# Patient Record
Sex: MALE | Race: OTHER | NOT HISPANIC OR LATINO | Employment: UNEMPLOYED | URBAN - METROPOLITAN AREA
[De-identification: names, ages, dates, MRNs, and addresses within clinical notes are randomized per-mention and may not be internally consistent; named-entity substitution may affect disease eponyms.]

---

## 2022-11-25 ENCOUNTER — HOSPITAL ENCOUNTER (EMERGENCY)
Facility: HOSPITAL | Age: 57
Discharge: HOME | End: 2022-11-26
Attending: INTERNAL MEDICINE
Payer: MEDICARE

## 2022-11-25 DIAGNOSIS — F32.A DEPRESSION WITH SUICIDAL IDEATION: ICD-10-CM

## 2022-11-25 DIAGNOSIS — F10.920 ALCOHOLIC INTOXICATION WITHOUT COMPLICATION: ICD-10-CM

## 2022-11-25 DIAGNOSIS — R45.851 DEPRESSION WITH SUICIDAL IDEATION: ICD-10-CM

## 2022-11-25 DIAGNOSIS — Z00.8 MEDICAL CLEARANCE FOR PSYCHIATRIC ADMISSION: Primary | ICD-10-CM

## 2022-11-25 LAB
ALBUMIN SERPL-MCNC: 3.7 GM/DL (ref 3.5–5)
ALBUMIN/GLOB SERPL: 1.1 RATIO (ref 1.1–2)
ALP SERPL-CCNC: 125 UNIT/L (ref 40–150)
ALT SERPL-CCNC: 59 UNIT/L (ref 0–55)
AMPHET UR QL SCN: NEGATIVE
APAP SERPL-MCNC: <17.4 UG/ML (ref 17.4–30)
APPEARANCE UR: CLEAR
AST SERPL-CCNC: 99 UNIT/L (ref 5–34)
BARBITURATE SCN PRESENT UR: POSITIVE
BASOPHILS # BLD AUTO: 0.02 X10(3)/MCL (ref 0–0.2)
BASOPHILS NFR BLD AUTO: 0.3 %
BENZODIAZ UR QL SCN: NEGATIVE
BILIRUB UR QL STRIP.AUTO: NEGATIVE MG/DL
BILIRUBIN DIRECT+TOT PNL SERPL-MCNC: 0.3 MG/DL
BUN SERPL-MCNC: 5.5 MG/DL (ref 8.4–25.7)
CALCIUM SERPL-MCNC: 9 MG/DL (ref 8.4–10.2)
CANNABINOIDS UR QL SCN: NEGATIVE
CHLORIDE SERPL-SCNC: 102 MMOL/L (ref 98–107)
CO2 SERPL-SCNC: 23 MMOL/L (ref 22–29)
COCAINE UR QL SCN: NEGATIVE
COLOR UR AUTO: YELLOW
CREAT SERPL-MCNC: 0.67 MG/DL (ref 0.73–1.18)
EOSINOPHIL # BLD AUTO: 0.16 X10(3)/MCL (ref 0–0.9)
EOSINOPHIL NFR BLD AUTO: 2.7 %
ERYTHROCYTE [DISTWIDTH] IN BLOOD BY AUTOMATED COUNT: 13.4 % (ref 11.5–17)
ETHANOL SERPL-MCNC: 372 MG/DL
GFR SERPLBLD CREATININE-BSD FMLA CKD-EPI: >60 MLS/MIN/1.73/M2
GLOBULIN SER-MCNC: 3.5 GM/DL (ref 2.4–3.5)
GLUCOSE SERPL-MCNC: 94 MG/DL (ref 74–100)
GLUCOSE UR QL STRIP.AUTO: NEGATIVE MG/DL
HCT VFR BLD AUTO: 39.2 % (ref 42–52)
HGB BLD-MCNC: 13.8 GM/DL (ref 14–18)
IMM GRANULOCYTES # BLD AUTO: 0.01 X10(3)/MCL (ref 0–0.04)
IMM GRANULOCYTES NFR BLD AUTO: 0.2 %
KETONES UR QL STRIP.AUTO: NEGATIVE MG/DL
LEUKOCYTE ESTERASE UR QL STRIP.AUTO: NEGATIVE UNIT/L
LYMPHOCYTES # BLD AUTO: 2.75 X10(3)/MCL (ref 0.6–4.6)
LYMPHOCYTES NFR BLD AUTO: 46.6 %
MCH RBC QN AUTO: 33.8 PG (ref 27–31)
MCHC RBC AUTO-ENTMCNC: 35.2 MG/DL (ref 33–36)
MCV RBC AUTO: 96.1 FL (ref 80–94)
MDMA UR QL SCN: NEGATIVE
MONOCYTES # BLD AUTO: 0.3 X10(3)/MCL (ref 0.1–1.3)
MONOCYTES NFR BLD AUTO: 5.1 %
NEUTROPHILS # BLD AUTO: 2.7 X10(3)/MCL (ref 2.1–9.2)
NEUTROPHILS NFR BLD AUTO: 45.1 %
NITRITE UR QL STRIP.AUTO: NEGATIVE
NRBC BLD AUTO-RTO: 0 %
OPIATES UR QL SCN: NEGATIVE
PCP UR QL: NEGATIVE
PH UR STRIP.AUTO: 6.5 [PH]
PH UR: 6.5 [PH] (ref 3–11)
PLATELET # BLD AUTO: 141 X10(3)/MCL (ref 130–400)
PMV BLD AUTO: 9.2 FL (ref 7.4–10.4)
POTASSIUM SERPL-SCNC: 3.3 MMOL/L (ref 3.5–5.1)
PROT SERPL-MCNC: 7.2 GM/DL (ref 6.4–8.3)
PROT UR QL STRIP.AUTO: NEGATIVE MG/DL
RBC # BLD AUTO: 4.08 X10(6)/MCL (ref 4.7–6.1)
RBC UR QL AUTO: NEGATIVE UNIT/L
SALICYLATES SERPL-MCNC: <5 MG/DL
SARS-COV-2 RDRP RESP QL NAA+PROBE: NEGATIVE
SODIUM SERPL-SCNC: 140 MMOL/L (ref 136–145)
SP GR UR STRIP.AUTO: 1.01
SPECIFIC GRAVITY, URINE AUTO (.000) (OHS): 1.01 (ref 1–1.03)
UROBILINOGEN UR STRIP-ACNC: 2 MG/DL
WBC # SPEC AUTO: 5.9 X10(3)/MCL (ref 4.5–11.5)

## 2022-11-25 PROCEDURE — 85025 COMPLETE CBC W/AUTO DIFF WBC: CPT | Performed by: INTERNAL MEDICINE

## 2022-11-25 PROCEDURE — 87635 SARS-COV-2 COVID-19 AMP PRB: CPT | Performed by: INTERNAL MEDICINE

## 2022-11-25 PROCEDURE — 80307 DRUG TEST PRSMV CHEM ANLYZR: CPT | Performed by: INTERNAL MEDICINE

## 2022-11-25 PROCEDURE — 80143 DRUG ASSAY ACETAMINOPHEN: CPT | Performed by: INTERNAL MEDICINE

## 2022-11-25 PROCEDURE — 82077 ASSAY SPEC XCP UR&BREATH IA: CPT | Performed by: INTERNAL MEDICINE

## 2022-11-25 PROCEDURE — 81003 URINALYSIS AUTO W/O SCOPE: CPT | Mod: 59 | Performed by: INTERNAL MEDICINE

## 2022-11-25 PROCEDURE — 80053 COMPREHEN METABOLIC PANEL: CPT | Performed by: INTERNAL MEDICINE

## 2022-11-25 PROCEDURE — 80179 DRUG ASSAY SALICYLATE: CPT | Performed by: INTERNAL MEDICINE

## 2022-11-25 PROCEDURE — 99285 EMERGENCY DEPT VISIT HI MDM: CPT | Mod: 25

## 2022-11-26 ENCOUNTER — HOSPITAL ENCOUNTER (INPATIENT)
Facility: HOSPITAL | Age: 57
LOS: 11 days | Discharge: HOME | DRG: 885 | End: 2022-12-07
Attending: PSYCHIATRY & NEUROLOGY | Admitting: PSYCHIATRY & NEUROLOGY
Payer: MEDICARE

## 2022-11-26 VITALS
TEMPERATURE: 98 F | RESPIRATION RATE: 20 BRPM | DIASTOLIC BLOOD PRESSURE: 78 MMHG | SYSTOLIC BLOOD PRESSURE: 132 MMHG | HEIGHT: 69 IN | HEART RATE: 96 BPM | OXYGEN SATURATION: 100 % | BODY MASS INDEX: 24.44 KG/M2 | WEIGHT: 165 LBS

## 2022-11-26 DIAGNOSIS — F33.2 SEVERE RECURRENT MAJOR DEPRESSION: ICD-10-CM

## 2022-11-26 PROBLEM — F10.10 ALCOHOL ABUSE: Status: ACTIVE | Noted: 2022-11-26

## 2022-11-26 LAB
ETHANOL SERPL-MCNC: 150 MG/DL
ETHANOL SERPL-MCNC: 200 MG/DL

## 2022-11-26 PROCEDURE — 25000003 PHARM REV CODE 250: Performed by: NURSE PRACTITIONER

## 2022-11-26 PROCEDURE — 11400000 HC PSYCH PRIVATE ROOM

## 2022-11-26 PROCEDURE — 82077 ASSAY SPEC XCP UR&BREATH IA: CPT | Performed by: EMERGENCY MEDICINE

## 2022-11-26 PROCEDURE — 82077 ASSAY SPEC XCP UR&BREATH IA: CPT | Performed by: INTERNAL MEDICINE

## 2022-11-26 RX ORDER — LOPERAMIDE HYDROCHLORIDE 2 MG/1
2 CAPSULE ORAL EVERY 4 HOURS PRN
Status: DISCONTINUED | OUTPATIENT
Start: 2022-11-26 | End: 2022-12-07 | Stop reason: HOSPADM

## 2022-11-26 RX ORDER — LORAZEPAM 1 MG/1
1 TABLET ORAL 3 TIMES DAILY
Status: COMPLETED | OUTPATIENT
Start: 2022-11-26 | End: 2022-11-26

## 2022-11-26 RX ORDER — QUETIAPINE 200 MG/1
200 TABLET, FILM COATED, EXTENDED RELEASE ORAL NIGHTLY
Status: ON HOLD | COMMUNITY
End: 2022-11-26 | Stop reason: CLARIF

## 2022-11-26 RX ORDER — MUPIROCIN 20 MG/G
OINTMENT TOPICAL 2 TIMES DAILY
Status: DISPENSED | OUTPATIENT
Start: 2022-11-26 | End: 2022-12-01

## 2022-11-26 RX ORDER — ACETAMINOPHEN 325 MG/1
650 TABLET ORAL EVERY 6 HOURS PRN
Status: DISCONTINUED | OUTPATIENT
Start: 2022-11-26 | End: 2022-12-07 | Stop reason: HOSPADM

## 2022-11-26 RX ORDER — HYDROXYZINE PAMOATE 50 MG/1
50 CAPSULE ORAL 3 TIMES DAILY PRN
Status: DISCONTINUED | OUTPATIENT
Start: 2022-11-26 | End: 2022-12-07 | Stop reason: HOSPADM

## 2022-11-26 RX ORDER — CLONIDINE HYDROCHLORIDE 0.1 MG/1
0.1 TABLET ORAL EVERY 4 HOURS PRN
Status: DISCONTINUED | OUTPATIENT
Start: 2022-11-26 | End: 2022-12-07 | Stop reason: HOSPADM

## 2022-11-26 RX ORDER — ONDANSETRON 4 MG/1
4 TABLET, ORALLY DISINTEGRATING ORAL EVERY 6 HOURS PRN
Status: DISCONTINUED | OUTPATIENT
Start: 2022-11-26 | End: 2022-12-07 | Stop reason: HOSPADM

## 2022-11-26 RX ORDER — QUETIAPINE FUMARATE 200 MG/1
200 TABLET, FILM COATED ORAL NIGHTLY
Status: ON HOLD | COMMUNITY
End: 2022-12-06 | Stop reason: HOSPADM

## 2022-11-26 RX ORDER — LORAZEPAM 0.5 MG/1
0.5 TABLET ORAL EVERY 12 HOURS
Status: COMPLETED | OUTPATIENT
Start: 2022-11-30 | End: 2022-12-01

## 2022-11-26 RX ORDER — LORAZEPAM 0.5 MG/1
0.5 TABLET ORAL EVERY 6 HOURS
Status: COMPLETED | OUTPATIENT
Start: 2022-11-28 | End: 2022-11-29

## 2022-11-26 RX ORDER — LORAZEPAM 0.5 MG/1
0.5 TABLET ORAL EVERY 8 HOURS
Status: COMPLETED | OUTPATIENT
Start: 2022-11-29 | End: 2022-11-30

## 2022-11-26 RX ORDER — ADHESIVE BANDAGE
30 BANDAGE TOPICAL DAILY PRN
Status: DISCONTINUED | OUTPATIENT
Start: 2022-11-26 | End: 2022-12-07 | Stop reason: HOSPADM

## 2022-11-26 RX ORDER — SERTRALINE HYDROCHLORIDE 100 MG/1
100 TABLET, FILM COATED ORAL DAILY
Status: ON HOLD | COMMUNITY
End: 2022-12-06 | Stop reason: HOSPADM

## 2022-11-26 RX ORDER — LORAZEPAM 1 MG/1
1 TABLET ORAL EVERY 4 HOURS
Status: DISCONTINUED | OUTPATIENT
Start: 2022-11-27 | End: 2022-11-27

## 2022-11-26 RX ADMIN — LORAZEPAM 1 MG: 1 TABLET ORAL at 08:11

## 2022-11-26 RX ADMIN — LORAZEPAM 1 MG: 1 TABLET ORAL at 11:11

## 2022-11-26 RX ADMIN — MUPIROCIN: 20 OINTMENT TOPICAL at 08:11

## 2022-11-26 RX ADMIN — LORAZEPAM 1 MG: 1 TABLET ORAL at 03:11
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
## 2022-11-26 NOTE — PLAN OF CARE
Patient admitted today;  ETOH detox protocol initiated.  POC for depression, Suicidal Ideation and other problems identified and initiated.

## 2022-11-27 LAB
CHOLEST SERPL-MCNC: 156 MG/DL
CHOLEST/HDLC SERPL: 2 {RATIO} (ref 0–5)
GLUCOSE P FAST SERPL-MCNC: 92 MG/DL (ref 74–100)
HDLC SERPL-MCNC: 73 MG/DL (ref 35–60)
LDLC SERPL CALC-MCNC: 70 MG/DL (ref 50–140)
T PALLIDUM AB SER QL: NONREACTIVE
TRIGL SERPL-MCNC: 64 MG/DL (ref 34–140)
VLDLC SERPL CALC-MCNC: 13 MG/DL

## 2022-11-27 PROCEDURE — 80061 LIPID PANEL: CPT | Performed by: NURSE PRACTITIONER

## 2022-11-27 PROCEDURE — 25000003 PHARM REV CODE 250: Performed by: NURSE PRACTITIONER

## 2022-11-27 PROCEDURE — 82947 ASSAY GLUCOSE BLOOD QUANT: CPT | Performed by: NURSE PRACTITIONER

## 2022-11-27 PROCEDURE — 86780 TREPONEMA PALLIDUM: CPT | Performed by: NURSE PRACTITIONER

## 2022-11-27 PROCEDURE — 11400000 HC PSYCH PRIVATE ROOM

## 2022-11-27 PROCEDURE — 36415 COLL VENOUS BLD VENIPUNCTURE: CPT | Performed by: NURSE PRACTITIONER

## 2022-11-27 RX ORDER — DULOXETIN HYDROCHLORIDE 30 MG/1
30 CAPSULE, DELAYED RELEASE ORAL DAILY
Status: COMPLETED | OUTPATIENT
Start: 2022-11-27 | End: 2022-11-28

## 2022-11-27 RX ORDER — LORAZEPAM 1 MG/1
1 TABLET ORAL ONCE
Status: COMPLETED | OUTPATIENT
Start: 2022-11-27 | End: 2022-11-27

## 2022-11-27 RX ORDER — QUETIAPINE FUMARATE 25 MG/1
50 TABLET, FILM COATED ORAL NIGHTLY
Status: DISCONTINUED | OUTPATIENT
Start: 2022-11-27 | End: 2022-11-29

## 2022-11-27 RX ORDER — DULOXETIN HYDROCHLORIDE 60 MG/1
60 CAPSULE, DELAYED RELEASE ORAL DAILY
Status: DISCONTINUED | OUTPATIENT
Start: 2022-11-29 | End: 2022-12-07 | Stop reason: HOSPADM

## 2022-11-27 RX ADMIN — MUPIROCIN: 20 OINTMENT TOPICAL at 08:11

## 2022-11-27 RX ADMIN — DULOXETINE HYDROCHLORIDE 30 MG: 30 CAPSULE, DELAYED RELEASE ORAL at 01:11

## 2022-11-27 RX ADMIN — LORAZEPAM 1 MG: 1 TABLET ORAL at 08:11

## 2022-11-27 RX ADMIN — LORAZEPAM 1 MG: 1 TABLET ORAL at 03:11

## 2022-11-27 RX ADMIN — QUETIAPINE FUMARATE 50 MG: 25 TABLET ORAL at 08:11

## 2022-11-27 NOTE — CONSULTS
Hospital Medicine Consultation Note        Patient Name: Abhinav Tello  MRN: [id number]  Patient Class: IP- Psych   Admission Date: 11/26/2022 10:30 AM  Length of Stay: 1  Attending Physician: [unfilled]   Primary Care Provider: Primary Doctor No  Face-to-Face encounter date: 11/27/2022 g  Consulting Physician: [facility] Medicine - Stacy Chua MD  Reason for Consult: medical evaluation  Chief Complaint: No chief complaint on file.        Patient information was obtained from patient, patient's family, past medical records.    HISTORY OF PRESENT ILLNESS:   Abhinav Tello is a 57 y.o. male with hx of substance abuse, alcohol abuse, depression, admitted to [facility] dx of major depression , substance abuse and alcohol intoxication. Pt is homeless, became increasingly depressed and suicidal . He was intoxicated and found on floor of [employer] station and brought to ER at [facility]. Etoh level 372. Admits drinking 6 beers and 8-9 shots of liquor daily. Uds pos for barbiturates, not taking or prescribed anything .  Hospital Medicine team was consulted for medical management . He is a poor historian, intoxicated but arousable, dishelved . Denies any chest pain, palpitations, sob     PAST MEDICAL HISTORY:   No past medical history on file.    PAST SURGICAL HISTORY:   No past surgical history on file.    ALLERGIES:   Patient has no known allergies.    FAMILY HISTORY:   Reviewed and negative    SOCIAL HISTORY:     Social History     Tobacco Use    Smoking status: Not on file    Smokeless tobacco: Not on file   Substance Use Topics    Alcohol use: Not on file        HOME MEDICATIONS:     Prior to Admission medications    Medication Sig Start Date End Date Taking? Authorizing Provider   QUEtiapine (SEROQUEL) 200 MG Tab Take 200 mg by mouth nightly. Non compliant.    Historical Provider   sertraline (ZOLOFT) 100 MG tablet Take 100 mg by mouth once daily. Non compliant.    Historical Provider       REVIEW OF SYSTEMS:   Except as  documented, all other systems reviewed and negative     PHYSICAL EXAM:     VITAL SIGNS: 24 HRS MIN & MAX LAST   Temp  Min: 98.8 °F (37.1 °C)  Max: 99.1 °F (37.3 °C) 99.1 °F (37.3 °C)   BP  Min: 134/67  Max: 141/88 137/89   Pulse  Min: 85  Max: 89  88   Resp  Min: 20  Max: 20 20   SpO2  Min: 96 %  Max: 97 % 97 %       Vitals Reviewed  General appearance: malnourished, dishelved   HENT: Atraumatic head. Moist mucous membranes of oral cavity.  Eyes: Normal extraocular movements.   Neck: Supple.   Lungs: Clear to auscultation bilaterally. No wheezing present.   Heart: Regular rate and rhythm. S1 and S2 present with no murmurs/gallop/rub. No pedal edema. No JVD present.   Abdomen: Soft, non-distended, non-tender. No rebound tenderness/guarding. Bowel sounds are normal.   Extremities: No cyanosis, clubbing, or edema.  Skin: No Rash.   Neuro: Mno focal neuro deficits   Psych/mental status: drowsy and intoxicated .     LABS AND IMAGING:     Recent Labs   Lab 11/25/22  2230   WBC 5.9   RBC 4.08*   HGB 13.8*   HCT 39.2*   MCV 96.1*   MCH 33.8*   MCHC 35.2   RDW 13.4      MPV 9.2       Recent Labs   Lab 11/25/22  2230      K 3.3*   CO2 23   BUN 5.5*   CREATININE 0.67*   CALCIUM 9.0   ALBUMIN 3.7   ALKPHOS 125   ALT 59*   AST 99*   BILITOT 0.3        Microbiology Results (last 7 days)       ** No results found for the last 168 hours. **               ASSESSMENT & PLAN:     Alcohol intoxication  Major depression w SI  Bipolar  Homelessness    Plan  On detox protocol per primary team  Remaining management per primary team  Medically stable  Thank you for the consult. Will be available if needed       VTE Prophylaxis: Ambulatory  __________________________________________________________________________  INPATIENT LIST OF MEDICATIONS     Current Facility-Administered Medications:     acetaminophen tablet 650 mg, 650 mg, Oral, Q6H PRN, ÓSCAR Costello    cloNIDine tablet 0.1 mg, 0.1 mg, Oral, Q4H PRN, Christos  ÓSCAR Hameed    DULoxetine DR capsule 30 mg, 30 mg, Oral, Daily, ÓSCAR Costello    [START ON 11/29/2022] DULoxetine DR capsule 60 mg, 60 mg, Oral, Daily, ÓSCAR Costello    hydrOXYzine pamoate capsule 50 mg, 50 mg, Oral, TID PRN, ÓSCAR Costello    loperamide capsule 2 mg, 2 mg, Oral, Q4H PRN, ÓSCAR Costello    [START ON 11/28/2022] LORazepam tablet 0.5 mg, 0.5 mg, Oral, Q6H, ÓSCAR Costello    [START ON 11/29/2022] LORazepam tablet 0.5 mg, 0.5 mg, Oral, Q8H, ÓSCAR Costello    [START ON 11/30/2022] LORazepam tablet 0.5 mg, 0.5 mg, Oral, Q12H, ÓSCAR Costello    LORazepam tablet 1 mg, 1 mg, Oral, Q4H, ÓSCAR Costello, 1 mg at 11/27/22 0806    magnesium hydroxide 400 mg/5 ml suspension 2,400 mg, 30 mL, Oral, Daily PRN, ÓSCAR Costello    mupirocin 2 % ointment, , Nasal, BID, ÓSCAR Costello, Given at 11/26/22 2013    ondansetron disintegrating tablet 4 mg, 4 mg, Oral, Q6H PRN, ÓSCAR Costello    QUEtiapine tablet 50 mg, 50 mg, Oral, QHS, ÓSCAR Costello      Scheduled Meds:   DULoxetine  30 mg Oral Daily    [START ON 11/29/2022] DULoxetine  60 mg Oral Daily    [START ON 11/28/2022] LORazepam  0.5 mg Oral Q6H    [START ON 11/29/2022] LORazepam  0.5 mg Oral Q8H    [START ON 11/30/2022] LORazepam  0.5 mg Oral Q12H    LORazepam  1 mg Oral Q4H    mupirocin   Nasal BID    QUEtiapine  50 mg Oral QHS     Continuous Infusions:  PRN Meds:.acetaminophen, cloNIDine, hydrOXYzine pamoate, loperamide, magnesium hydroxide 400 mg/5 ml, ondansetron    RADIOLOGY                                                                                                    No image results found.              All diagnosis and differential diagnosis have been reviewed; assessment and plan has been documented; I have personally reviewed the labs and test results that are presently available; I have reviewed the patients medication list;  I have reviewed the consulting providers response and recommendations. I have reviewed or attempted to review medical records based upon their availability.    All of the patient and family questions have been addressed and answered. Patient's is agreeable to the above stated plan. I will continue to monitor closely and make adjustments to medical management as needed.    Stacy Chua MD   [facility] Medicine Team  11/27/2022
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
## 2022-11-27 NOTE — GROUP NOTE
Education Group      Group Focus: Offered group on discharge planning.      Number of patients in attendance: 7    Group Start Time: 1500  Group End Time:  1545  Groups Date: 11/27/2022  Group Topic:  Behavioral Health  Group Department: [facility]  Group Facilitators:  Emma West LPN  _____________________________________________________________________    Patient Name: Abhinav Tello  MRN: [id number]  Patient Class: IP- Psych   Admission Date\Time: 11/26/2022 10:30 AM  Hospital Length of Stay: 1  Primary Care Provider: Primary Doctor No     Referred by: Behavioral Medicine Unit Treatment Team     Target symptoms: NA     Patient's response to treatment: did not attend     Progress toward goals: NA     Interval History: NA     Diagnosis:      Plan: Continue treatment on BMU

## 2022-11-28 PROCEDURE — 25000003 PHARM REV CODE 250: Performed by: NURSE PRACTITIONER

## 2022-11-28 PROCEDURE — 11400000 HC PSYCH PRIVATE ROOM

## 2022-11-28 RX ADMIN — ACETAMINOPHEN 650 MG: 325 TABLET, FILM COATED ORAL at 08:11

## 2022-11-28 RX ADMIN — DULOXETINE HYDROCHLORIDE 30 MG: 30 CAPSULE, DELAYED RELEASE ORAL at 08:11

## 2022-11-28 RX ADMIN — LORAZEPAM 0.5 MG: 0.5 TABLET ORAL at 11:11

## 2022-11-28 RX ADMIN — LORAZEPAM 0.5 MG: 0.5 TABLET ORAL at 05:11

## 2022-11-28 RX ADMIN — QUETIAPINE FUMARATE 50 MG: 25 TABLET ORAL at 08:11

## 2022-11-28 NOTE — NURSING
"Abhinav is withdrawn with low energy and motivation. Flat, sad affect with passive SI. Contracts to no self harm. AH hearing "negative" voices. Complained of poor sleep last night. Tolerating detox protocol, positive for body aches and tremors. Q 15 min safety checks with suicide and fall precautions. Will monitor mood and behavior and offer emotional support.  "

## 2022-11-28 NOTE — NURSING
"Lying in bed, awakens easily to voice, calm and cooperative, encouraged to get up to eat and drink, patient reports past poor appetite, tremors and shakes, presently on detox protocol,  reports +AVH "conversation in my head and can't understand, can't stop seeing these people, won't stop, shadows of people".  +SI "all the time".  Medication compliant.   Patient did get up to go get a snack in the dayroom. Will continue to monitor. Detox, fall and seizure precautions.  "
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
## 2022-11-29 PROBLEM — F10.10 ALCOHOL ABUSE: Status: RESOLVED | Noted: 2022-11-26 | Resolved: 2022-11-29

## 2022-11-29 PROBLEM — F33.2 SEVERE RECURRENT MAJOR DEPRESSION: Status: RESOLVED | Noted: 2022-11-26 | Resolved: 2022-11-29

## 2022-11-29 PROBLEM — F25.1 SCHIZOAFFECTIVE DISORDER, DEPRESSIVE TYPE: Status: ACTIVE | Noted: 2022-11-29

## 2022-11-29 PROBLEM — F10.20 ALCOHOL USE DISORDER, SEVERE, DEPENDENCE: Status: ACTIVE | Noted: 2022-11-29

## 2022-11-29 PROCEDURE — 25000003 PHARM REV CODE 250: Performed by: NURSE PRACTITIONER

## 2022-11-29 PROCEDURE — 25000003 PHARM REV CODE 250: Performed by: PSYCHIATRY & NEUROLOGY

## 2022-11-29 PROCEDURE — 11400000 HC PSYCH PRIVATE ROOM

## 2022-11-29 RX ORDER — QUETIAPINE FUMARATE 200 MG/1
200 TABLET, FILM COATED ORAL NIGHTLY
Status: DISCONTINUED | OUTPATIENT
Start: 2022-11-29 | End: 2022-11-30

## 2022-11-29 RX ADMIN — HYDROXYZINE PAMOATE 50 MG: 50 CAPSULE ORAL at 08:11

## 2022-11-29 RX ADMIN — MUPIROCIN: 20 OINTMENT TOPICAL at 08:11

## 2022-11-29 RX ADMIN — DULOXETINE HYDROCHLORIDE 60 MG: 60 CAPSULE, DELAYED RELEASE ORAL at 08:11

## 2022-11-29 RX ADMIN — LORAZEPAM 0.5 MG: 0.5 TABLET ORAL at 01:11

## 2022-11-29 RX ADMIN — LORAZEPAM 0.5 MG: 0.5 TABLET ORAL at 12:11

## 2022-11-29 RX ADMIN — LORAZEPAM 0.5 MG: 0.5 TABLET ORAL at 05:11

## 2022-11-29 RX ADMIN — LORAZEPAM 0.5 MG: 0.5 TABLET ORAL at 10:11

## 2022-11-29 RX ADMIN — QUETIAPINE FUMARATE 200 MG: 200 TABLET ORAL at 08:11

## 2022-11-29 NOTE — SUBJECTIVE & OBJECTIVE
"Seen treatment team.    Psychiatric Mental Status Exam:  General Appearance: disheveled  Arousal: alert  Behavior: cooperative  Movements and Motor Activity: tremulous  Orientation: oriented to person, place, and time  Speech: normal  Mood: Depressed  Affect: congruent  Thought Process: goal-directed  Associations: intact  Thought Content and Perceptions: he has SI, no HI, he has paranoia and auditory and visual hallucinations  Recent and Remote Memory: intact  Attention and Concentration: intact  Fund of Knowledge: aware of current events  Insight: adequate  Judgment: adequate    Family History    None       Tobacco Use    Smoking status: Not on file    Smokeless tobacco: Not on file   Substance and Sexual Activity    Alcohol use: Not on file    Drug use: Not on file    Sexual activity: Not on file     Psychotherapeutics (From admission, onward)      Start     Stop Route Frequency Ordered    11/30/22 2100  LORazepam tablet 0.5 mg         12/01 2059 Oral Every 12 hours 11/26/22 1146    11/29/22 1400  LORazepam tablet 0.5 mg         11/30 1359 Oral Every 8 hours 11/26/22 1146    11/29/22 0900  DULoxetine DR capsule 60 mg         -- Oral Daily 11/27/22 1154    11/27/22 2100  QUEtiapine tablet 50 mg         -- Oral Nightly 11/27/22 1154             Review of Systems  Objective:     Vital Signs (Most Recent):  Temp: 98.2 °F (36.8 °C) (11/29/22 0600)  Pulse: 83 (11/29/22 0600)  Resp: 18 (11/29/22 0600)  BP: 111/74 (11/29/22 0600)  SpO2: 97 % (11/29/22 0600) Vital Signs (24h Range):  Temp:  [98.2 °F (36.8 °C)-98.5 °F (36.9 °C)] 98.2 °F (36.8 °C)  Pulse:  [79-83] 83  Resp:  [18] 18  SpO2:  [97 %-98 %] 97 %  BP: (108-111)/(71-74) 111/74     Height: 5' 6" (167.6 cm)  Weight: 79.2 kg (174 lb 9.7 oz)  Body mass index is 28.18 kg/m².    No intake or output data in the 24 hours ending 11/29/22 0849    Physical Exam     Significant Labs: Last 72 Hours:   Recent Lab Results         11/27/22  0505        Cholesterol 156       Gluc " Fast 92       HDL 73       LDL Cholesterol External 70.00       Syphilis Antibody Nonreactive       Total Cholesterol/HDL Ratio 2       Triglycerides 64       Very Low Density Lipoprotein 13               Significant Imaging: None

## 2022-11-29 NOTE — PSYCH
MULTIDISCILINARY TEAM      ______________________________________________________________________  Psychiatrist Signature           Print Name    Credentials    Date/Time                      _______________________________________________________________________  Registered Nurse Signature         Print Name    Credentials    Date/Time                  _______________________________________________________________________   Signature           Print Name    Credentials    Date/Time         _______________________________________________________________________  UR Coordinator Signature          Print Name    Credentials    Date/Time                  ESTIMATED LOS: __________      I have reviewed my treatment plan with staff and have signed the Patient Responsibilities form.      ______________________________________________________________________  Patient Signature   Print Name   Date/Time
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
## 2022-11-29 NOTE — GROUP NOTE
Nursing Group      Group Focus: Symptoms Management      Number of patients in attendance: 6    Group Start Time: 1300  Group End Time:  1330  Groups Date: 11/28/2022  Group Topic:  Behavioral Health  Group Department: [facility]  Group Facilitators:  Jocelin Monson RN  _____________________________________________________________________    Patient Name: Abhinav Tello  MRN: [id number]  Patient Class: IP- Psych   Admission Date\Time: 11/26/2022 10:30 AM  Hospital Length of Stay: 2  Primary Care Provider: Primary Doctor No     Referred by: Behavioral Medicine Unit Treatment Team     Target symptoms: Alcohol Abuse, Depression, and Psychosis     Patient's response to treatment: Active Listening and Self-disclosure     Progress toward goals: Progressing slowly     Interval History: Attended and participated with good response     Diagnosis: MDD     Plan: Continue treatment on BMU

## 2022-11-29 NOTE — GROUP NOTE
Group     Group Focus: Promoting Healthy Lifestyles      Number of patients in attendance: 8    Group Start Time: 1600  Group End Time:  1630  Groups Date: 11/28/2022  Group Topic:  Behavioral Health  Group Department: [facility]  Group Facilitators:  Ana Laura Bustillo LPN  _____________________________________________________________________    Patient Name: Abhinav Tello  MRN: [id number]  Patient Class: IP- Psych   Admission Date\Time: 11/26/2022 10:30 AM  Hospital Length of Stay: 3  Primary Care Provider: Primary Doctor No     Referred by: Behavioral Medicine Unit Treatment Team     Target symptoms: Alcohol Abuse, Substance Abuse, Depression, Anxiety, Mood Disorder, and Psychosis     Patient's response to treatment: Active Listening and Self-disclosure     Progress toward goals: Progressing adequately     Interval History: Participated with good interaction     Diagnosis: SAD, depressed     Plan: Continue treatment on BMU

## 2022-11-30 PROCEDURE — 25000003 PHARM REV CODE 250: Performed by: PSYCHIATRY & NEUROLOGY

## 2022-11-30 PROCEDURE — 25000003 PHARM REV CODE 250: Performed by: NURSE PRACTITIONER

## 2022-11-30 PROCEDURE — 11400000 HC PSYCH PRIVATE ROOM

## 2022-11-30 RX ORDER — QUETIAPINE FUMARATE 200 MG/1
400 TABLET, FILM COATED ORAL NIGHTLY
Status: DISCONTINUED | OUTPATIENT
Start: 2022-11-30 | End: 2022-12-01

## 2022-11-30 RX ADMIN — QUETIAPINE FUMARATE 400 MG: 200 TABLET ORAL at 08:11

## 2022-11-30 RX ADMIN — MUPIROCIN: 20 OINTMENT TOPICAL at 08:11

## 2022-11-30 RX ADMIN — ACETAMINOPHEN 650 MG: 325 TABLET, FILM COATED ORAL at 05:11

## 2022-11-30 RX ADMIN — LORAZEPAM 0.5 MG: 0.5 TABLET ORAL at 05:11

## 2022-11-30 RX ADMIN — LORAZEPAM 0.5 MG: 0.5 TABLET ORAL at 08:11

## 2022-11-30 RX ADMIN — DULOXETINE HYDROCHLORIDE 60 MG: 60 CAPSULE, DELAYED RELEASE ORAL at 08:11
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
## 2022-11-30 NOTE — GROUP NOTE
Group Psychotherapy       Group Focus: Life Skills      Number of patients in attendance: 4    Group Start Time: 0900  Group End Time:  0945  Groups Date: 11/30/2022  Group Topic:  Behavioral Health  Group Department: [facility]  Group Facilitators:  Stewart Rain LCSW  _____________________________________________________________________    Patient Name: Abhinav Tello  MRN: [id number]  Patient Class: IP- Psych   Admission Date\Time: 11/26/2022 10:30 AM  Hospital Length of Stay: 4  Primary Care Provider: Primary Doctor No     Referred by: Behavioral Medicine Unit Treatment Team     Target symptoms: Alcohol Abuse     Patient's response to treatment: na     Progress toward goals: na     Interval History: Pt did not attend group     Diagnosis:      Plan: Continue treatment on BMU

## 2022-11-30 NOTE — SUBJECTIVE & OBJECTIVE
"Patient was seen via video telemedicine and consented to the interview. The patient was located in [place], [place] and the provider was located in [place], [place].    Psychiatric Mental Status Exam:  General Appearance: normal  Arousal: alert  Behavior: cooperative  Movements and Motor Activity: normal  Orientation: oriented to person, place, and time  Speech: normal  Mood: Depressed  Affect: congruent  Thought Process: goal-directed  Associations: intact  Thought Content and Perceptions: he has SI, no HI, he has paranoia and auditory and visual hallucinations  Recent and Remote Memory: intact  Attention and Concentration: intact  Fund of Knowledge: aware of current events  Insight: adequate  Judgment: adequate    Family History    None       Tobacco Use    Smoking status: Not on file    Smokeless tobacco: Not on file   Substance and Sexual Activity    Alcohol use: Not on file    Drug use: Not on file    Sexual activity: Not on file     Psychotherapeutics (From admission, onward)      Start     Stop Route Frequency Ordered    11/30/22 2100  LORazepam tablet 0.5 mg         12/01 2059 Oral Every 12 hours 11/26/22 1146    11/29/22 2100  QUEtiapine tablet 200 mg         -- Oral Nightly 11/29/22 0857    11/29/22 0900  DULoxetine DR capsule 60 mg         -- Oral Daily 11/27/22 1154             Review of Systems  Objective:     Vital Signs (Most Recent):  Temp: 98.3 °F (36.8 °C) (11/30/22 0608)  Pulse: 75 (11/30/22 0608)  Resp: 16 (11/30/22 0608)  BP: 124/80 (11/30/22 0608)  SpO2: 97 % (11/30/22 0608) Vital Signs (24h Range):  Temp:  [98.3 °F (36.8 °C)-98.5 °F (36.9 °C)] 98.3 °F (36.8 °C)  Pulse:  [74-75] 75  Resp:  [16-18] 16  SpO2:  [97 %-98 %] 97 %  BP: (120-124)/(75-80) 124/80     Height: 5' 6" (167.6 cm)  Weight: 79.2 kg (174 lb 9.7 oz)  Body mass index is 28.18 kg/m².    No intake or output data in the 24 hours ending 11/30/22 1316    Physical Exam     Significant Labs: Last 24 Hours:   Recent Lab Results       None      "       Significant Imaging: None
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
## 2022-11-30 NOTE — GROUP NOTE
Education Group       Group Focus: Offered group to increase knowledge of coping skills.     Number of patients in attendance: 7    Group Start Time: 1000  Group End Time:  1045  Groups Date: 11/30/2022  Group Topic:  Behavioral Health  Group Department: [facility]  Group Facilitators:  Emma West LPN  _____________________________________________________________________    Patient Name: Abhinav Tello  MRN: [id number]  Patient Class: IP- Psych   Admission Date\Time: 11/26/2022 10:30 AM  Hospital Length of Stay: 4  Primary Care Provider: Primary Doctor No     Referred by: Behavioral Medicine Unit Treatment Team     Target symptoms: Depression     Patient's response to treatment: Active Listening, Self-disclosure, and Frequent Questions     Progress toward goals: Progressing adequately     Interval History: cooperative     Diagnosis: schizoaffective     Plan: Continue treatment on BMU

## 2022-12-01 PROCEDURE — 11400000 HC PSYCH PRIVATE ROOM

## 2022-12-01 PROCEDURE — 25000003 PHARM REV CODE 250: Performed by: NURSE PRACTITIONER

## 2022-12-01 RX ORDER — QUETIAPINE FUMARATE 300 MG/1
600 TABLET, FILM COATED ORAL NIGHTLY
Status: DISCONTINUED | OUTPATIENT
Start: 2022-12-01 | End: 2022-12-07 | Stop reason: HOSPADM

## 2022-12-01 RX ADMIN — QUETIAPINE FUMARATE 600 MG: 300 TABLET ORAL at 08:12

## 2022-12-01 RX ADMIN — LORAZEPAM 0.5 MG: 0.5 TABLET ORAL at 08:12

## 2022-12-01 RX ADMIN — DULOXETINE HYDROCHLORIDE 60 MG: 60 CAPSULE, DELAYED RELEASE ORAL at 08:12

## 2022-12-01 RX ADMIN — ACETAMINOPHEN 650 MG: 325 TABLET, FILM COATED ORAL at 08:12

## 2022-12-01 RX ADMIN — HYDROXYZINE PAMOATE 50 MG: 50 CAPSULE ORAL at 01:12

## 2022-12-01 NOTE — GROUP NOTE
Group Psychotherapy       Group Focus: Psychodynamic Group Psychotherapy      Number of patients in attendance: 4    Group Start Time: 0900  Group End Time:  0945  Groups Date: 12/1/2022  Group Topic:  Behavioral Health  Group Department: [facility]  Group Facilitators:  Stewart Rain LCSW  _____________________________________________________________________    Patient Name: Abhinav Tello  MRN: [id number]  Patient Class: IP- Psych   Admission Date\Time: 11/26/2022 10:30 AM  Hospital Length of Stay: 5  Primary Care Provider: Primary Doctor No     Referred by: Behavioral Medicine Unit Treatment Team     Target symptoms: Alcohol Abuse     Patient's response to treatment: na     Progress toward goals: na     Interval History: Pt did not attend group     Diagnosis:      Plan: Continue treatment on BMU

## 2022-12-01 NOTE — PROGRESS NOTES
"12/1/2022 8:44 AM   Abhinav Tello   1965   [id number]        Psychiatry Progress Note     SUBJECTIVE:   Abhinav Tello is a 57 y.o. male admitted for alcohol abuse, hallucinations, and depression. He is nearing the end of the detox protocal. He reports mild tremors only. No nausea or diarrhea. Vital signs more stable. He is continuing to have visual hallucinations. He stated that last night he kept seeing someone "poking their head out of the shelves where we keep our clothes". He also reports seeing "alot of shadow people" running around his bed and going underneath his bed. He reported that at one point during the night he really needed the restroom but was too scared to leave his bed out of fear that someone or something "would grab my ankles from underneath". He also stated that he hears womens' voices telling him to "get out of here". He spoke of shadow people handing him things and once he touches it it turns to dust and falls to the floor. He said this happened with what he thought was a glass of water and then a candy bar. He reports that this has occurred since childhood and that he also always had night terrors and sleep paralysis and describes a figure without a face hovering over him. His sleep patterns remain problematic. He did not notice a difference with the increase in Seroquel last night so due to the severity of psychosis and impaired sleep I will increase the Seroquel to 600mg and monitor. He did agree and verbalized understanding.        Current Medications:   Scheduled Meds:    DULoxetine  60 mg Oral Daily    mupirocin   Nasal BID    QUEtiapine  400 mg Oral QHS      PRN Meds: acetaminophen, cloNIDine, hydrOXYzine pamoate, loperamide, magnesium hydroxide 400 mg/5 ml, ondansetron   Psychotherapeutics (From admission, onward)      Start     Stop Route Frequency Ordered    11/30/22 2100  QUEtiapine tablet 400 mg         -- Oral Nightly 11/30/22 1325    11/29/22 0900  DULoxetine DR capsule 60 mg     "     -- Oral Daily 11/27/22 1154            Allergies:   Review of patient's allergies indicates:  No Known Allergies     OBJECTIVE:   Vitals   Vitals:    12/01/22 0616   BP: 122/81   Pulse: 86   Resp: 18   Temp: 97.7 °F (36.5 °C)        Labs/Imaging/Studies:   No results found for this or any previous visit (from the past 36 hour(s)).       Medical Review Of Systems:  A comprehensive review of systems was negative.      Psychiatric Mental Status Exam:  General Appearance: dressed in hospital garb  Arousal: alert  Behavior: cooperative  Movements and Motor Activity: no abnormal involuntary movements noted; no tics, no tremors, no akathisia, no dystonia, no evidence of tardive dyskinesia; no psychomotor agitation or retardation  Orientation: oriented to person, place, time, and situation  Speech: normal rate, rhythm, volume, tone and pitch  Mood: Anxious  Affect: mood-congruent  Thought Process: organized  Associations: no loosening of associations  Thought Content and Perceptions: + visual hallucinations, +auditory hallucinations; denies any SI/HI/death wishes  Recent and Remote Memory: intact  Attention and Concentration: intact  Fund of Knowledge: aware of current events  Insight: adequate  Judgment:  improved    ASSESSMENT/PLAN:   Diagnosis:  SCHIZOPHRENIA AND OTHER PSYCHOTIC DISORDERS; Schizoaffective Disorder  depressed type    Alcohol use disorder severe  Plan:  Increase Seroquel to 600mg every evening    Expected Disposition Plan:  He is requesting to return home to [place] where his family is. He needs money to buy a ticket to get there. He does have several checks that he cannot cash since he has no ID. He is hoping that the [employer] that was helping him could cash at least one of the checks for him so that he can buy what he needs to get home        Christos JOE PMHNP  Psychiatry  [facility]

## 2022-12-01 NOTE — NURSING
"Lying in bed, appears asleep at this time. Earlier patient complaining of "night frederick", awake sitting on the side of the bed, patient reporting having "visions of people walking in my room.  I thought people were going to kill me.  During the day as well.  Including my roommate.  I also felt drunk all over again.  I saw someone trying to get in my window."  Interacts well with peers and staff in the dayroom.   Medication compliant.    Speech is clear.  Anxious.  Will continue to monitor.    "

## 2022-12-01 NOTE — GROUP NOTE
Education Group      Group Focus: Stress Management      Number of patients in attendance: 5    Group Start Time: 0945  Group End Time:  1045  Groups Date: 12/1/2022  Group Topic:  Behavioral Health  Group Department: [facility]  Group Facilitators:  Emma West LPN  _____________________________________________________________________    Patient Name: Abhinav Tello  MRN: [id number]  Patient Class: IP- Psych   Admission Date\Time: 11/26/2022 10:30 AM  Hospital Length of Stay: 5  Primary Care Provider: Primary Doctor No     Referred by: Behavioral Medicine Unit Treatment Team     Target symptoms: na     Patient's response to treatment: did not attend     Progress toward goals: na     Interval History: na   Diagnosis:      Plan: Continue treatment on BMU

## 2022-12-02 PROCEDURE — 25000003 PHARM REV CODE 250: Performed by: NURSE PRACTITIONER

## 2022-12-02 PROCEDURE — 25000003 PHARM REV CODE 250: Performed by: PSYCHIATRY & NEUROLOGY

## 2022-12-02 PROCEDURE — 11400000 HC PSYCH PRIVATE ROOM

## 2022-12-02 RX ORDER — BUSPIRONE HYDROCHLORIDE 10 MG/1
10 TABLET ORAL 2 TIMES DAILY
Status: DISCONTINUED | OUTPATIENT
Start: 2022-12-02 | End: 2022-12-07 | Stop reason: HOSPADM

## 2022-12-02 RX ADMIN — QUETIAPINE FUMARATE 600 MG: 300 TABLET ORAL at 08:12

## 2022-12-02 RX ADMIN — BUSPIRONE HYDROCHLORIDE 10 MG: 10 TABLET ORAL at 08:12

## 2022-12-02 RX ADMIN — DULOXETINE HYDROCHLORIDE 60 MG: 60 CAPSULE, DELAYED RELEASE ORAL at 08:12

## 2022-12-02 NOTE — GROUP NOTE
Group Psychotherapy       Group Focus: Changing thinking process from being ill to being well.      Number of patients in attendance: 5    Group Start Time: 0900  Group End Time:  0945  Groups Date: 12/2/2022  Group Topic:  Behavioral Health  Group Department: [facility]  Group Facilitators:  Stewart Rain LCSW  _____________________________________________________________________    Patient Name: Abhinav Tello  MRN: [id number]  Patient Class: IP- Psych   Admission Date\Time: 11/26/2022 10:30 AM  Hospital Length of Stay: 6  Primary Care Provider: Primary Doctor No     Referred by: Behavioral Medicine Unit Treatment Team     Target symptoms: Alcohol Abuse and Mood Disorder     Patient's response to treatment: Active Listening and Self-disclosure     Progress toward goals: Progressing adequately     Interval History: Pt much improved.  Group explored changing thought processes to promote wellness     Diagnosis:      Plan: Continue treatment on BMU

## 2022-12-02 NOTE — SUBJECTIVE & OBJECTIVE
"Patient was seen via video telemedicine and consented to the interview. The patient was located in [place], [place] and the provider was located in [place], [place].    Psychiatric Mental Status Exam:  General Appearance: normal  Arousal: alert  Behavior: cooperative  Movements and Motor Activity: normal  Orientation: oriented to person, place, and time  Speech: normal  Mood: anxious and depressed  Affect: congruent  Thought Process: goal-directed  Associations: intact  Thought Content and Perceptions: no SI/HI, No paranoia and no auditory or visual hallucinations  Recent and Remote Memory: intact  Attention and Concentration: intact  Fund of Knowledge: aware of current events  Insight: adequate  Judgment: adequate    Family History    None       Tobacco Use    Smoking status: Not on file    Smokeless tobacco: Not on file   Substance and Sexual Activity    Alcohol use: Not on file    Drug use: Not on file    Sexual activity: Not on file     Psychotherapeutics (From admission, onward)      Start     Stop Route Frequency Ordered    12/01/22 2100  QUEtiapine tablet 600 mg        Question:  Is the patient competent?  Answer:  Yes    -- Oral Nightly 12/01/22 0852    11/29/22 0900  DULoxetine DR capsule 60 mg         -- Oral Daily 11/27/22 1154             Review of Systems  Objective:     Vital Signs (Most Recent):  Temp: 97.6 °F (36.4 °C) (12/02/22 0611)  Pulse: 81 (12/02/22 0611)  Resp: 18 (12/02/22 0611)  BP: 119/73 (12/02/22 0611)  SpO2: 100 % (12/02/22 0611) Vital Signs (24h Range):  Temp:  [97.6 °F (36.4 °C)-98.4 °F (36.9 °C)] 97.6 °F (36.4 °C)  Pulse:  [72-81] 81  Resp:  [16-18] 18  SpO2:  [97 %-100 %] 100 %  BP: (112-119)/(70-73) 119/73     Height: 5' 6" (167.6 cm)  Weight: 79.2 kg (174 lb 9.7 oz)  Body mass index is 28.18 kg/m².    No intake or output data in the 24 hours ending 12/02/22 1425    Physical Exam     Significant Labs: Last 72 Hours:   Recent Lab Results       None            Significant Imaging: None  "
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
## 2022-12-02 NOTE — GROUP NOTE
Nursing Group       Group Focus: Symptoms Management      Number of patients in attendance: 6    Group Start Time: 1000  Group End Time:  1030  Groups Date: 12/2/2022  Group Topic:  Behavioral Health  Group Department: [facility]  Group Facilitators:  Jocelin Monson RN  _____________________________________________________________________    Patient Name: Abhinav Tello  MRN: [id number]  Patient Class: IP- Psych   Admission Date\Time: 11/26/2022 10:30 AM  Hospital Length of Stay: 6  Primary Care Provider: Primary Doctor No     Referred by: Behavioral Medicine Unit Treatment Team     Target symptoms: Depression and Anxiety     Patient's response to treatment: Active Listening and Self-disclosure     Progress toward goals: Progressing adequately     Interval History: Attended and participated with god response     Diagnosis: SAD     Plan: Continue treatment on BMU

## 2022-12-03 PROCEDURE — 25000003 PHARM REV CODE 250: Performed by: NURSE PRACTITIONER

## 2022-12-03 PROCEDURE — 11400000 HC PSYCH PRIVATE ROOM

## 2022-12-03 PROCEDURE — 25000003 PHARM REV CODE 250: Performed by: PSYCHIATRY & NEUROLOGY

## 2022-12-03 RX ADMIN — ACETAMINOPHEN 650 MG: 325 TABLET, FILM COATED ORAL at 10:12

## 2022-12-03 RX ADMIN — DULOXETINE HYDROCHLORIDE 60 MG: 60 CAPSULE, DELAYED RELEASE ORAL at 08:12

## 2022-12-03 RX ADMIN — BUSPIRONE HYDROCHLORIDE 10 MG: 10 TABLET ORAL at 08:12

## 2022-12-03 RX ADMIN — QUETIAPINE FUMARATE 600 MG: 300 TABLET ORAL at 08:12

## 2022-12-03 NOTE — GROUP NOTE
Group     Group Focus: Stress Management      Number of patients in attendance: 2    Group Start Time: 1515  Group End Time:  1545  Groups Date: 12/2/2022  Group Topic:  Behavioral Health  Group Department: [facility]  Group Facilitators:  Ana Laura Bustillo LPN  _____________________________________________________________________    Patient Name: Abhinav Tello  MRN: [id number]  Patient Class: IP- Psych   Admission Date\Time: 11/26/2022 10:30 AM  Hospital Length of Stay: 6  Primary Care Provider: Primary Doctor No     Referred by: Behavioral Medicine Unit Treatment Team     Target symptoms: Alcohol Abuse, Substance Abuse, Mood Disorder, and Psychosis     Patient's response to treatment: Active Listening and Self-disclosure     Progress toward goals: Progressing adequately     Interval History: Participated with good interaction     Diagnosis: SAD     Plan: Continue treatment on BMU

## 2022-12-03 NOTE — NURSING
Awake, alert and oriented. Mood and affect improved. Detox protocol complete. Denies SI. AVH decreased  Complained of poor sleep last night. Met with Harley Lopez added bid for increased anxiety. Q 15 min safety checks with suicide precautions. Will monitor mood and offer emotional support.

## 2022-12-03 NOTE — GROUP NOTE
Group       Group Focus: Relationship Dynamics      Number of patients in attendance: 3    Group Start Time: 0930  Group End Time:  1000  Groups Date: 12/3/2022  Group Topic:  Behavioral Health  Group Department: [facility]  Group Facilitators:  Ana Laura Bustillo LPN  _____________________________________________________________________    Patient Name: Abhinav Tello  MRN: [id number]  Patient Class: IP- Psych   Admission Date\Time: 11/26/2022 10:30 AM  Hospital Length of Stay: 7  Primary Care Provider: Primary Doctor No     Referred by: Behavioral Medicine Unit Treatment Team     Target symptoms: Depression and Mood Disorder     Patient's response to treatment: Active Listening and Self-disclosure     Progress toward goals: Progressing well     Interval History: Participated with good interaction     Diagnosis: SAD depressed     Plan: Continue treatment on BMU

## 2022-12-04 PROCEDURE — 25000003 PHARM REV CODE 250: Performed by: NURSE PRACTITIONER

## 2022-12-04 PROCEDURE — 25000003 PHARM REV CODE 250: Performed by: PSYCHIATRY & NEUROLOGY

## 2022-12-04 PROCEDURE — 11400000 HC PSYCH PRIVATE ROOM

## 2022-12-04 RX ADMIN — BUSPIRONE HYDROCHLORIDE 10 MG: 10 TABLET ORAL at 08:12

## 2022-12-04 RX ADMIN — QUETIAPINE FUMARATE 600 MG: 300 TABLET ORAL at 08:12

## 2022-12-04 RX ADMIN — ACETAMINOPHEN 650 MG: 325 TABLET, FILM COATED ORAL at 12:12

## 2022-12-04 RX ADMIN — DULOXETINE HYDROCHLORIDE 60 MG: 60 CAPSULE, DELAYED RELEASE ORAL at 08:12

## 2022-12-04 NOTE — NURSING
"Awake alert and oriented. Mood and affect improved. Denies SI or hallucinations. "Meds are working well". Sleeping and eating well. Q 15 min safety checks with suicide and fall precautions. Will monitor mood and behavior and offer emotional support.  "

## 2022-12-04 NOTE — GROUP NOTE
Nursing Group      Group Focus: Symptoms Management      Number of patients in attendance: 7    Group Start Time: 1015  Group End Time:  1045  Groups Date: 12/3/2022  Group Topic:  Behavioral Health  Group Department: [facility]  Group Facilitators:  Jocelin Monson RN  _____________________________________________________________________    Patient Name: Abhinav Tello  MRN: [id number]  Patient Class: IP- Psych   Admission Date\Time: 11/26/2022 10:30 AM  Hospital Length of Stay: 7  Primary Care Provider: Primary Doctor No     Referred by: Behavioral Medicine Unit Treatment Team     Target symptoms: Depression and Anxiety     Patient's response to treatment: Active Listening and Self-disclosure     Progress toward goals: Progressing adequately     Interval History: Attended and participated with good response     Diagnosis: SAD     Plan: Continue treatment on BMU
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
## 2022-12-04 NOTE — NURSING
Abhinav denies SI and AVH. He contracts for safety. He reports that anxiety is decreasing. Pleasant and cooperative. Paranoia has resolved. He is more visible. Affect has improved. No S/S withdrawals observed. Med compliant and satisfied with medication regimen. Sleep has improved. He is hoping to go to [place] upon D/C. Encouragement given. (+) coping skills discussed. Education provided. He is being monitored closely. Q15 observations maintained for safety and precautions. No S/S seizure activity witnessed nor reported.

## 2022-12-05 PROCEDURE — 25000003 PHARM REV CODE 250: Performed by: PSYCHIATRY & NEUROLOGY

## 2022-12-05 PROCEDURE — 11400000 HC PSYCH PRIVATE ROOM

## 2022-12-05 PROCEDURE — 25000003 PHARM REV CODE 250: Performed by: NURSE PRACTITIONER

## 2022-12-05 RX ADMIN — QUETIAPINE FUMARATE 600 MG: 300 TABLET ORAL at 08:12

## 2022-12-05 RX ADMIN — BUSPIRONE HYDROCHLORIDE 10 MG: 10 TABLET ORAL at 08:12

## 2022-12-05 RX ADMIN — ACETAMINOPHEN 650 MG: 325 TABLET, FILM COATED ORAL at 08:12

## 2022-12-05 RX ADMIN — DULOXETINE HYDROCHLORIDE 60 MG: 60 CAPSULE, DELAYED RELEASE ORAL at 08:12

## 2022-12-05 NOTE — GROUP NOTE
Medication Education      Group Focus: Offered group to increase knowledge of meds, side effects, safety and importance of compliance.       Number of patients in attendance: 4    Group Start Time: 0900  Group End Time:  0945  Groups Date: 12/5/2022  Group Topic:  Behavioral Health  Group Department: [facility]  Group Facilitators:  Emma West LPN  _____________________________________________________________________    Patient Name: Abhinav Tello  MRN: [id number]  Patient Class: IP- Psych   Admission Date\Time: 11/26/2022 10:30 AM  Hospital Length of Stay: 9  Primary Care Provider: Primary Doctor No     Referred by: Behavioral Medicine Unit Treatment Team     Target symptoms: na     Patient's response to treatment: did not attend     Progress toward goals: na     Interval History: na     Diagnosis:      Plan: Continue treatment on BMU

## 2022-12-05 NOTE — NURSING
Abhinav denies SI and contracts for safety. Denies AVH. He is pleasant and cooperative. Anxiety has decreased. No s/s withdrawals. V/S are WDL. Somewhat guarded. Less restless. Energy is leveling. Speech is soft and quiet. No psychotic symptoms. Pt is satisfied with medications. Sleep improving. Continues to hope for D/C to [place]. Encouragement given. (+) coping skills discussed. Q15 observations maintained for safety and precautions.Abhinav is being monitored closely.

## 2022-12-05 NOTE — GROUP NOTE
Group       Group Focus: Communication Skills      Number of patients in attendance: 4    Group Start Time: 1615  Group End Time:  1645  Groups Date: 12/4/2022  Group Topic:  Behavioral Health  Group Department: [facility]  Group Facilitators:  Ana Laura Bustillo LPN  _____________________________________________________________________    Patient Name: Abhinav Tello  MRN: [id number]  Patient Class: IP- Psych   Admission Date\Time: 11/26/2022 10:30 AM  Hospital Length of Stay: 8  Primary Care Provider: Primary Doctor No     Referred by: Behavioral Medicine Unit Treatment Team     Target symptoms: Depression, Anxiety, and Mood Disorder     Patient's response to treatment: Active Listening and Self-disclosure     Progress toward goals: Progressing well     Interval History: Participated with good interaction     Diagnosis: SAD     Plan: Continue treatment on BMU

## 2022-12-06 PROCEDURE — 11400000 HC PSYCH PRIVATE ROOM

## 2022-12-06 PROCEDURE — 25000003 PHARM REV CODE 250: Performed by: NURSE PRACTITIONER

## 2022-12-06 PROCEDURE — 25000003 PHARM REV CODE 250: Performed by: PSYCHIATRY & NEUROLOGY

## 2022-12-06 RX ORDER — QUETIAPINE FUMARATE 300 MG/1
600 TABLET, FILM COATED ORAL NIGHTLY
Qty: 60 TABLET | Refills: 0 | Status: SHIPPED | OUTPATIENT
Start: 2022-12-06 | End: 2023-01-05

## 2022-12-06 RX ORDER — DULOXETIN HYDROCHLORIDE 60 MG/1
60 CAPSULE, DELAYED RELEASE ORAL DAILY
Qty: 30 CAPSULE | Refills: 0 | Status: SHIPPED | OUTPATIENT
Start: 2022-12-07 | End: 2023-01-06

## 2022-12-06 RX ORDER — BUSPIRONE HYDROCHLORIDE 10 MG/1
10 TABLET ORAL 2 TIMES DAILY
Qty: 60 TABLET | Refills: 0 | Status: SHIPPED | OUTPATIENT
Start: 2022-12-06 | End: 2023-01-05

## 2022-12-06 RX ADMIN — BUSPIRONE HYDROCHLORIDE 10 MG: 10 TABLET ORAL at 08:12

## 2022-12-06 RX ADMIN — DULOXETINE HYDROCHLORIDE 60 MG: 60 CAPSULE, DELAYED RELEASE ORAL at 08:12

## 2022-12-06 RX ADMIN — ACETAMINOPHEN 650 MG: 325 TABLET, FILM COATED ORAL at 09:12

## 2022-12-06 RX ADMIN — QUETIAPINE FUMARATE 600 MG: 300 TABLET ORAL at 08:12

## 2022-12-06 NOTE — PLAN OF CARE
Patient attended tx team today.  He presented as calm,  in a good mood,  verbalized that he is no longer suicidal, is hopeful for his future and going back home to [place].  Spoke fondly of his family and looking forward to reuniting with them.  He has been med compliant,  interacts spontaneously with peers and staff.  Appetite, sleep and energy level have stabilized and he has met his goals and objectives of tx.     SW was able to obtain info regarding pt's bus ticket and has the ticket for tomorrow.   Plan includes [facility] transport to [facility] in [facility] tomorrow;  meds to be escripted to [id number] which is close to the bus station.  Patient is aware of all discharge plans and instructions.  Verbalizes much gratitude for the help he has received here.     Will continue with Q 15 min safety checks.

## 2022-12-06 NOTE — NURSING
Sitting in dayroom, watching TV with select peer,  denies any hallucinations, reports sleeping much better and ready to discharge soon, compliant with and appreciative with all care, denies any concerns at this time,  will continue to monitor.
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
## 2022-12-06 NOTE — SUBJECTIVE & OBJECTIVE
"Seen for treatment team.    Psychiatric Mental Status Exam:  General Appearance: normal  Arousal: alert  Behavior: cooperative  Movements and Motor Activity: normal  Orientation: oriented to person, place, and time  Speech: normal  Mood:good  Affect: congruent  Thought Process: goal-directed  Associations: intact  Thought Content and Perceptions: no SI/HI, No paranoia and no auditory or visual hallucinations  Recent and Remote Memory: intact  Attention and Concentration: intact  Fund of Knowledge: aware of current events  Insight: good  Judgment: good    Family History    None       Tobacco Use    Smoking status: Not on file    Smokeless tobacco: Not on file   Substance and Sexual Activity    Alcohol use: Not on file    Drug use: Not on file    Sexual activity: Not on file     Psychotherapeutics (From admission, onward)      Start     Stop Route Frequency Ordered    12/02/22 2100  busPIRone tablet 10 mg         -- Oral 2 times daily 12/02/22 1434    12/01/22 2100  QUEtiapine tablet 600 mg        Question:  Is the patient competent?  Answer:  Yes    -- Oral Nightly 12/01/22 0852    11/29/22 0900  DULoxetine DR capsule 60 mg         -- Oral Daily 11/27/22 1154             Review of Systems  Objective:     Vital Signs (Most Recent):  Temp: 98 °F (36.7 °C) (12/06/22 0608)  Pulse: 79 (12/06/22 0608)  Resp: 18 (12/06/22 0608)  BP: 122/80 (12/06/22 0608)  SpO2: 99 % (12/06/22 0608) Vital Signs (24h Range):  Temp:  [98 °F (36.7 °C)-98.8 °F (37.1 °C)] 98 °F (36.7 °C)  Pulse:  [79-80] 79  Resp:  [16-18] 18  SpO2:  [99 %-100 %] 99 %  BP: (122-151)/(80-86) 122/80     Height: 5' 6" (167.6 cm)  Weight: 86.3 kg (190 lb 4.1 oz)  Body mass index is 30.71 kg/m².    No intake or output data in the 24 hours ending 12/06/22 0853    Physical Exam     Significant Labs: Last 72 Hours:   Recent Lab Results       None            Significant Imaging: I have reviewed all pertinent imaging results/findings within the past 24 hours.  "

## 2022-12-07 VITALS
DIASTOLIC BLOOD PRESSURE: 82 MMHG | OXYGEN SATURATION: 96 % | BODY MASS INDEX: 30.58 KG/M2 | HEART RATE: 86 BPM | RESPIRATION RATE: 18 BRPM | WEIGHT: 190.25 LBS | SYSTOLIC BLOOD PRESSURE: 129 MMHG | HEIGHT: 66 IN | TEMPERATURE: 99 F

## 2022-12-07 PROCEDURE — 25000003 PHARM REV CODE 250: Performed by: PSYCHIATRY & NEUROLOGY

## 2022-12-07 PROCEDURE — 25000003 PHARM REV CODE 250: Performed by: NURSE PRACTITIONER

## 2022-12-07 RX ADMIN — BUSPIRONE HYDROCHLORIDE 10 MG: 10 TABLET ORAL at 08:12

## 2022-12-07 RX ADMIN — DULOXETINE HYDROCHLORIDE 60 MG: 60 CAPSULE, DELAYED RELEASE ORAL at 08:12

## 2022-12-07 RX ADMIN — ACETAMINOPHEN 650 MG: 325 TABLET, FILM COATED ORAL at 08:12
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
## 2022-12-07 NOTE — NURSING
Pt discharged to [facility] with bus ticket to [place] via [employer].  Pt denies SI/HI, hallucinations or ETOH cravings.  Pt expresses graduated for the help he received here and is very thankful he will be home for [place].  Belongings and discharge instructions given to pt.